# Patient Record
Sex: FEMALE | Race: WHITE
[De-identification: names, ages, dates, MRNs, and addresses within clinical notes are randomized per-mention and may not be internally consistent; named-entity substitution may affect disease eponyms.]

---

## 2019-03-28 ENCOUNTER — HOSPITAL ENCOUNTER (OUTPATIENT)
Dept: HOSPITAL 25 - OR | Age: 44
Discharge: HOME | End: 2019-03-28
Attending: ORTHOPAEDIC SURGERY
Payer: COMMERCIAL

## 2019-03-28 VITALS — DIASTOLIC BLOOD PRESSURE: 76 MMHG | SYSTOLIC BLOOD PRESSURE: 119 MMHG

## 2019-03-28 DIAGNOSIS — G89.18: ICD-10-CM

## 2019-03-28 DIAGNOSIS — J45.909: ICD-10-CM

## 2019-03-28 DIAGNOSIS — M25.774: ICD-10-CM

## 2019-03-28 DIAGNOSIS — K21.9: ICD-10-CM

## 2019-03-28 DIAGNOSIS — M76.61: Primary | ICD-10-CM

## 2019-03-28 DIAGNOSIS — M67.01: ICD-10-CM

## 2019-03-28 PROCEDURE — 81025 URINE PREGNANCY TEST: CPT

## 2019-03-28 PROCEDURE — 76000 FLUOROSCOPY <1 HR PHYS/QHP: CPT

## 2019-03-28 PROCEDURE — C1713 ANCHOR/SCREW BN/BN,TIS/BN: HCPCS

## 2019-03-28 RX ADMIN — FENTANYL CITRATE PRN MCG: 0.05 INJECTION, SOLUTION INTRAMUSCULAR; INTRAVENOUS at 13:12

## 2019-03-28 RX ADMIN — FENTANYL CITRATE PRN MCG: 0.05 INJECTION, SOLUTION INTRAMUSCULAR; INTRAVENOUS at 13:24

## 2019-03-28 RX ADMIN — FENTANYL CITRATE PRN MCG: 0.05 INJECTION, SOLUTION INTRAMUSCULAR; INTRAVENOUS at 14:09

## 2019-03-28 NOTE — OP
Operative Report - Blank





- Operative Report


Date of Operation: 03/28/19


Note: 


PATIENT: Taryn Caro





YOB: 1975





DATE OF SURGERY: 3/28/2019





SURGEON: Sarthak Corona MD





ASSISTANT: JAE March, whos assistance was necessary for positioning, 

retraction, help with instrumentation, and closure.





ANESTHESIOLOGIST: Dr. Gallardo





PREOPERATIVE DIAGNOSIS: Right Achilles tendinopathy, calcaneal exostosis, and 

gastrocnemius contracture.





POSTOPERATIVE DIAGNOSIS: Right Achilles tendinopathy, Achilles tendon tear, 

calcaneal exostosis, and gastrocnemius contracture.





OPERATION: 


1. Right Achilles tendon debridement and secondary repair 


2. Right calcaneus partial excision/saucerization


3. Right gastrocnemius recession (Lele procedure)





ANESTHESIA: Spinal





IMPLANTS: Arthrex Speedbridge





TOURNIQUET TIME: Less than 90 minutes with a well-padded thigh tourniquet at 

250mmHg





SPECIMENS: none





ESTIMATED BLOOD LOSS: minimal





COMPLICATIONS: none





STATUS: Stable from the operating room to the recovery room and then home.





INDICATIONS FOR PROCEDURE:


Taryn has had persistent pain at her insertional Achilles refractory to non-op 

treatment. Both operative and non operative treatment alternatives were 

reviewed. Further, the nature and risks of surgery were reviewed in careful 

detail in the office as well as in the preoperative holding area. Our 

discussions regarding the risks of surgery included, but were not limited to, 

infection, wound problems, nerve injury, neuroma, RSD, persistent symptoms, 

blood clot, rupture or failure to heal, failure of the surgery, and even the 

remote chance of catastrophic complication, including loss of limb.





DESCRIPTION OF PROCEDURE:


The patient was seen in the preoperative holding unit and informed written 

consent was obtained.  The appropriate extremity was marked. The patient was 

then brought to the operating room and carefully positioned on the operating 

room table in the prone position. Anesthesia was induced. All bony prominences 

were padded with great care. A well-padded thigh tourniquet was placed. A 

chlorhexidine based pre-scrub was performed followed by a chloraprep prep and 

drape in standard sterile fashion. A surgical safety pause was then conducted 

in which we confirmed the appropriate patient, extremity, planned procedure, 

availability of equipment, indication and administration of prophylactic 

antibiotics, and DVT prophylaxis in the form of a compression boot on the non-

surgical extremity. 





Exsanguination of the extremity was performed and the tourniquet was inflated.  

I began by utilizing a longitudinal incision overlying the posteromedial of the 

Achilles. I then carefully dissected down to the tendinous layer, maintaining 

full-thickness flaps.  I exposed the Achilles tendon and removed it from the 

posterior aspect of the calcaneus.  There was some degeneration of the tendon, 

which was sharply excised with a 15 blade scalpel.  There was a split 

longitudinal tear of the tendon which was repaired with 0 Vicryl suture.





I then exposed the calcaneal exostoses as well as the posterior-superior 

calcaneal tuberosity, which was prominent.  I then utilized a small oscillating 

saw to excise the exostosis and prominent aspect of the calcaneal tuberosity, 

thus performing a saucerization of the calcaneus.  I made sure all edges were 

smooth with a rasp and Rongeur.





There was significant tension on the tendon after debridement when I 

reapproximated it to the calcaneus so I decided to move forward with a 

gastrocnemius recession. I made an approximately 3-cm incision at the 

posteromedial calf.  I carried the dissection through the soft tissue and 

divided the crural fascia longitudinally.  I then exposed the fascia of the 

gastrocnemius muscle.  Great care was taken to protect the sural nerve 

throughout this procedure.  I cleared all adhesions from the posterior aspect 

of the gastrocnemius fascia and then transected this in its entirety from 

medially to laterally.  I then identified the plantaris tendon, which was also 

tight medially. This was transected. I then again confirmed that the sural 

nerve was in continuity.  





I then used an Arthrex speedbridge to perform a double row repair of the 

Achilles insertion on to the freshly osteotomized surface of the posterior 

calcaneus.  This provided excellent fixation and compression of the insertional 

Achilles.





The distal wound was then copiously irrigated and meticulously closed in layers 

utilizing 3-0 Monocryl for the subdermal layer, and 3-0 nylon for the skin.  

The proximal wound was was irrigated and closed in layers using 3-0 Monocryl 

and skin staples.  A sterile dressing was then applied followed by a splint 

with the ankle in resting equinus position.  





The patient was then awakened from anesthesia and transferred to the recovery 

room in stable condition. There were no complications. All needle and sponge 

counts were correct at the end of the case.





ATTESTATION: I attest I was present and scrubbed and performed the critical 

portions of the procedure myself. 





POSTOPERATIVE PLAN: The patient will remain non-weight-bearing for an 

anticipated duration is 4 weeks and follow up in two weeks for likely suture 

removal and Steri-Strip application.

## 2019-03-29 NOTE — PRO
DATE OF SERVICE:  03/28/19 - [facility]

 

HISTORY:  I was called by Dr. Gallardo to assist with a postop nerve block for the 
patient at the request of Dr. Corona for control of postoperative pain.  The 
risks and benefits of the procedure including, but not limited to bleeding, 
bruising, infection, nerve injury were discussed with Ms. Caro, who did 
verbalize understanding prior to receiving anesthesia for surgery and again in 
the postoperative holding area.  Both she and her family consented to proceed 
with the nerve block in the PACU in addition to her prior consent in her 
discussion with Dr. Gallardo.

 

PROCEDURE:  Right sided popliteal sciatic nerve block for postoperative pain as 
requested by Dr. Sarthak Corona for control of postoperative pain.  Ultrasound 
guidance was used.

 

The patient was placed in a left lateral recumbent position.  Her vital signs 
were monitored by the PACU RN, Ruben.  Her right leg was prepped in the usual 
sterile fashion.  An ultrasound machine was used to identify the popliteal 
artery as well as the tibial nerve and lateral peroneal nerves.  A 21-gauge 4-
inch EchoStim needle was used to access the perineural space under ultrasound 
visualization.  No intravascular or intraneural injections occurred throughout 
the procedure.  A total of 15 cc of 0.5% bupivacaine with epinephrine 1:200,000 
was injected into the perineural space after aspirate was negative for heme 
throughout each 5 cc.  A picture on the ultrasound machine was obtained and 
placed in the patient's chart. The patient tolerated the procedure well with no 
paresthesias.  She experienced immediate relief of her right sided ankle pain, 
which was previously an 8/10 per her report despite management with 
postoperative medications in the PACU.  She was then continually monitored by 
the PACU nurse until she met discharge criteria and was found to have no 
additional complications as a result of the nerve block.  She was subsequently 
discharged with the usual instructions and her questions were answered and her 
concerns were adequately addressed.

 

 [id number]/CPS #: [id number]

[facility] CenterKASSY